# Patient Record
Sex: FEMALE | Race: WHITE | URBAN - METROPOLITAN AREA
[De-identification: names, ages, dates, MRNs, and addresses within clinical notes are randomized per-mention and may not be internally consistent; named-entity substitution may affect disease eponyms.]

---

## 2017-09-13 ENCOUNTER — HISTORICAL (OUTPATIENT)
Dept: ADMINISTRATIVE | Facility: HOSPITAL | Age: 22
End: 2017-09-13

## 2017-09-13 LAB
BILIRUB SERPL-MCNC: NEGATIVE MG/DL
BLOOD URINE, POC: NEGATIVE
CLARITY, POC UA: NORMAL
COLOR, POC UA: NORMAL
GLUCOSE UR QL STRIP: NEGATIVE
KETONES UR QL STRIP: NEGATIVE
LEUKOCYTE EST, POC UA: NEGATIVE
NITRITE, POC UA: NEGATIVE
PH, POC UA: 6
PROTEIN, POC: NEGATIVE
SPECIFIC GRAVITY, POC UA: 1.01
UROBILINOGEN, POC UA: NORMAL

## 2017-09-15 LAB — FINAL CULTURE: NORMAL

## 2017-12-27 ENCOUNTER — HOSPITAL ENCOUNTER (OUTPATIENT)
Dept: OBSTETRICS AND GYNECOLOGY | Facility: HOSPITAL | Age: 22
End: 2017-12-27
Attending: OBSTETRICS & GYNECOLOGY | Admitting: OBSTETRICS & GYNECOLOGY

## 2022-04-09 ENCOUNTER — HISTORICAL (OUTPATIENT)
Dept: ADMINISTRATIVE | Facility: HOSPITAL | Age: 27
End: 2022-04-09

## 2022-04-25 VITALS
WEIGHT: 150 LBS | DIASTOLIC BLOOD PRESSURE: 66 MMHG | BODY MASS INDEX: 25.61 KG/M2 | OXYGEN SATURATION: 98 % | HEIGHT: 64 IN | SYSTOLIC BLOOD PRESSURE: 102 MMHG

## 2022-04-30 NOTE — PROGRESS NOTES
Pt Left clinic after ultrasound and seeing the doctor. She did not have her prenatal labs drawn or her quad screen drawn. After she was seen leaving the building, Mr. Donald, called pt to come back to draw blood if she could. Pt stated she had to get on a [employer] bus and go to [place]. She Stated she would be back in time for her next OB visit.   DELLA Harding

## 2022-04-30 NOTE — PROGRESS NOTES
Patient:   Ashwini Rose            MRN: [id number]            FIN: [id number]               Age:   22 years     Sex:  Female     :  1995   Associated Diagnoses:   None   Author:   Luke Cordero MD      Physician: HO  Reason for Exam: INITIAL PRENATAL VISIT; DATING  :2  Parity: 1  Gestational Age: 16w5d  EDC: 2018    Examination:  NUPUR: ADEQUATE  Fetal Tone: PRESENT  Fetal Movement: PRESENT  Fetal Heart Rate: 146  Fetus: SINGLE  Presentation: VARIABLE  Placenta:       Position: POSTERIOR      Grade: 0-I    Measurement:  BPD: 3.57cm  [17w0d]  HC: 13.16cm  [16w5d]  AC: 11.23cm  [17w0d]  FL: 2.33cm   [17w0d]  \  EGA:     17w0d  EDC:      2018    Comments: 1st trimester intrauterine pregnancy measuring 17w0d for EDC of 2018 consistent with earlier ultrasound at 13wks. FHR: 146.  ASSIGN EDC: 2018          This document has images

## 2022-04-30 NOTE — PROGRESS NOTES
Patient:   Ashwini Rose            MRN: [id number]            FIN: [id number]               Age:   22 years     Sex:  Female     :  1995   Associated Diagnoses:   None   Author:   David Cobb MD      Visit Information   Visit type:  Initial OB.    Source of history:  Self.    History limitation:  None.       History of Present Illness   23yo   F 17 0/7 WGA with EDC of 2018 by 1st trimester ultrasound presents to [facility] for Inital OB visit.    Today: no complaints    Chronic Issues: Hx of Hepatitis C infection, HSV, PCOS     OB Review of Systems:  Fetal movements: endorses  Vaginal bleeding: denies  Vaginal discharge: denies  Loss of fluid: denies  Contractions: denies  Headaches: denies  Vision changes: denies  Edema: denies    Gestational History:   - G1: 2016, , 39 WGA, 7#11.5oz, VFI, no complications  GYN History: LMP: 2017, Age at menarche: 10 yo, Menstrual hx: periods irregular, previously not on any medications for contraception, history of HSV  Past Medical History: Hepatitis C, HSV, PCOS   Surgical History: finger surgery  Social History: Currently smokes 5 cigarettes/day. Denies current alcohol or drug usage.  Medications: prenatal vitamins  Family History: endorses family history of HTN, DM, and cancer             Review of Systems   Constitutional:  No fever, No chills.    Eye:  No visual disturbances.    Respiratory:  No shortness of breath.    Cardiovascular:  No chest pain, No peripheral edema.    Gastrointestinal:  No nausea, No vomiting.    Genitourinary:  No dysuria, No hematuria.    Neurologic:  Alert and oriented X4, No headache.    All other systems are negative      Health Status   Current medications:  (Selected)   Prescriptions  Prescribed  Prenatabs Rx oral tablet: 1 tab(s), Oral, Daily, [facility] Formulary Sub. with Generic Brand, # 90 tab(s), 4 Refill(s)      Physical Examination   Vital Signs   2017 10:28 CDT      Temperature Oral          36.6  DegC                             Peripheral Pulse Rate     100 bpm                             Respiratory Rate          18 br/min                             SpO2                      98 %                             Systolic Blood Pressure   102 mmHg                             Diastolic Blood Pressure  66 mmHg     General:  No acute distress.    Neck:  Supple, Non-tender, No lymphadenopathy, no rash or skin changes appreciated.    Respiratory:  Lungs are clear to auscultation.    Cardiovascular:  Normal rate, Regular rhythm, No murmur.    Gastrointestinal:  Soft, Non-tender, Normal bowel sounds, Gravid.    Obstetric Exam     Uterus: fundal height, consistent with gestational age.     Bustamante/ Baby A fetal evaluation: heart tones.     Integumentary:  Warm, Dry.    Neurologic:  Alert, Oriented, No focal deficits.    Cognition and Speech:  Speech clear and coherent.    Psychiatric:  Cooperative.       Review / Management   Ultrasound:   Initial US   Comments: 1st trimester intrauterine pregnancy measuring 17w0d for EDC of 02/21/2018 consistent with earlier ultrasound at 13wks. FHR: 146.  ASSIGN EDC: 02/21/2018      Inital OB Labs:  Blood Type and Rh:   Antibody Screen:   CBC H/H:   HIV:   RPR:   GC:   CT:   HBsAg:  HCVAb:   Rubella:   Varicella:   UA & Culture:  Sickle Cell Screen:   PAP:   Influenza vaccine date:     15-20 Weeks Lab  Quad Screen:     28 Week Lab  1H GTT:   3H GTT:   Date of Rhogam if indicated:  Date of Tdap:     36 Week Lab  CBC H/H: _  RPR: _  GBS Culture: _  HIV: _  Urine: GC: _  Urine: CT: _       Results review:  Lab results   9/13/2017 10:48 CDT      Urine Color Urine Dipstick                Anayeli                             Urine Appearance Urine Dipstick           Cloudy                             pH Urine Dipstick         6                             Specific Gravity Urine Dipstick           1.010                             Blood Urine Dipstick      Negative                              Glucose Urine Dipstick    Negative                             Ketones Urine Dipstick    Negative                             Protein Urine Dipstick    Negative                             Bilirubin Urine Dipstick  Negative                             Urobilinogen Urine Dipstick               0.2 mg/dl                             Leukocytes Urine Dipstick Negative                             Nitrite Urine Dipstick    Negative  .       Impression and Plan   AP: 23 yo   F @ 17 0/7 WGA  1. IUP   - Start PNVs. Prescription provided   - Initial OB labs ordered   - pap smear done today   - Urine dip today reviewed   - Plans on breast/bottle feeding   - Desires Nuvaring for postpartum contraception   - Labor precautions given       Return to clinic in 4 weeks, High Risk OB clinic